# Patient Record
Sex: FEMALE | Race: WHITE | NOT HISPANIC OR LATINO | Employment: FULL TIME | URBAN - METROPOLITAN AREA
[De-identification: names, ages, dates, MRNs, and addresses within clinical notes are randomized per-mention and may not be internally consistent; named-entity substitution may affect disease eponyms.]

---

## 2017-06-08 ENCOUNTER — ALLSCRIPTS OFFICE VISIT (OUTPATIENT)
Dept: OTHER | Facility: OTHER | Age: 47
End: 2017-06-08

## 2017-06-08 ENCOUNTER — LAB REQUISITION (OUTPATIENT)
Dept: LAB | Facility: HOSPITAL | Age: 47
End: 2017-06-08
Payer: COMMERCIAL

## 2017-06-08 DIAGNOSIS — Z12.31 ENCOUNTER FOR SCREENING MAMMOGRAM FOR MALIGNANT NEOPLASM OF BREAST: ICD-10-CM

## 2017-06-08 DIAGNOSIS — Z01.419 ENCOUNTER FOR GYNECOLOGICAL EXAMINATION WITHOUT ABNORMAL FINDING: ICD-10-CM

## 2017-06-08 PROCEDURE — 87624 HPV HI-RISK TYP POOLED RSLT: CPT | Performed by: OBSTETRICS & GYNECOLOGY

## 2017-06-08 PROCEDURE — G0145 SCR C/V CYTO,THINLAYER,RESCR: HCPCS | Performed by: OBSTETRICS & GYNECOLOGY

## 2017-06-14 LAB — HPV RRNA GENITAL QL NAA+PROBE: NORMAL

## 2017-06-15 LAB
LAB AP GYN PRIMARY INTERPRETATION: NORMAL
Lab: NORMAL

## 2018-01-14 VITALS
HEIGHT: 67 IN | WEIGHT: 152.13 LBS | SYSTOLIC BLOOD PRESSURE: 100 MMHG | OXYGEN SATURATION: 98 % | BODY MASS INDEX: 23.88 KG/M2 | HEART RATE: 70 BPM | DIASTOLIC BLOOD PRESSURE: 58 MMHG

## 2018-02-19 ENCOUNTER — HOSPITAL ENCOUNTER (OUTPATIENT)
Dept: MAMMOGRAPHY | Facility: HOSPITAL | Age: 48
Discharge: HOME | End: 2018-02-19
Payer: COMMERCIAL

## 2018-02-19 DIAGNOSIS — Z12.31 ENCOUNTER FOR SCREENING MAMMOGRAM FOR MALIGNANT NEOPLASM OF BREAST: ICD-10-CM

## 2018-02-19 PROCEDURE — 77067 SCR MAMMO BI INCL CAD: CPT

## 2018-02-19 PROCEDURE — 77063 BREAST TOMOSYNTHESIS BI: CPT

## 2018-06-11 PROBLEM — Z12.31 ENCOUNTER FOR SCREENING MAMMOGRAM FOR BREAST CANCER: Status: ACTIVE | Noted: 2018-06-11

## 2018-06-11 PROBLEM — Z01.419 ENCOUNTER FOR ANNUAL ROUTINE GYNECOLOGICAL EXAMINATION: Status: ACTIVE | Noted: 2018-06-11

## 2018-06-11 NOTE — PROGRESS NOTES
Assessment/Plan:  Normal gynecologic exam  CoTest '20  Chest pain-to see PCP  Return to the office in 1 year  Self breast exams monthly  Annual 3-D mammography  Colonoscopy 55  Calcium 1,000 mg/d- does  Exercise 3 times a week- she does 2 since returning the work force      Diagnoses and all orders for this visit:    Encounter for annual routine gynecological examination    Encounter for screening mammogram for breast cancer  -     Mammo screening bilateral w 3d & cad; Future              Subjective:        Patient ID: John Paul Sol is a 50 y o  female  René Carcamo is here for her annual visit  Her last period was in January  She has experienced mild hot flashes over the past year which are fleeting and described as a nuisance  Occasionally she has experienced chest pain which may be related to stress  The following portions of the patient's history were reviewed and updated as appropriate: She  has no past medical history on file  PMH:   2 para 2; OFD, then SAVD - 41 wks '99 and induced at 39 wks- 5 cm  '01  DDD- Back  Menorrhagia, PC Bleeding Cervical Polyp - HSC, D&C, Polypectomy, Cauterization of Cx   Basal Cell Ca - Face and Shoulders ', 2nd episode MOH's nose   B12 Deficiency   BELEM-      Patient Active Problem List    Diagnosis Date Noted    Encounter for annual routine gynecological examination 2018    Encounter for screening mammogram for breast cancer 2018     She  has a past surgical history that includes Nose surgery and Dilation and curettage of uterus  Her family history includes Aortic aneurysm in her father; Colon cancer in her maternal grandmother; Heart disease in her paternal grandfather; Lung cancer in her maternal grandfather; No Known Problems in her brother, brother, brother, daughter, and son; Pancreatic cancer in her mother     FH:   Maternal grandmother- colon cancer  MGF- Lung Ca  PGF- MI 39  Mother- colon polyps, Pancreatitis, d POD 1 after surgery for Pancreatic Cancer 7/17, DVT 4/17  Father- AAA, 2 repaired 68, Squamous Cell Ca -  face, d 11/16 ischemic bowel, acute obstruction  She  reports that she has never smoked  She has never used smokeless tobacco  She reports that she drinks alcohol  She reports that she does not use drugs  SH:  She just returned to work in November '15 her former place of employment prior to children -   The office just moved to [place] which is a longer commute   '98  [employer] 6/99 - [employer] 4/01- playing football - both me  Magda Medrano works there too  Vasectomy  No current outpatient prescriptions on file  No current facility-administered medications for this visit  No current outpatient prescriptions on file prior to visit  No current facility-administered medications on file prior to visit  She has No Known Allergies       Review of Systems   Constitutional: Negative for activity change, appetite change, fatigue and unexpected weight change  Eyes: Negative for visual disturbance  Respiratory: Negative for cough, chest tightness, shortness of breath and wheezing  Cardiovascular: Positive for chest pain  Negative for palpitations and leg swelling  Breast: Patient denies tenderness, nipple discharge, masses, or erythema  Gastrointestinal: Negative for abdominal distention, abdominal pain, blood in stool, constipation, diarrhea, nausea and vomiting  Endocrine: Negative for cold intolerance and heat intolerance  Genitourinary: Negative for decreased urine volume, difficulty urinating, dyspareunia, dysuria, frequency, hematuria, menstrual problem, pelvic pain, urgency, vaginal bleeding, vaginal discharge and vaginal pain  Occasional BELEM  Musculoskeletal: Negative for arthralgias  Skin: Negative for rash  Neurological: Negative for weakness, light-headedness, numbness and headaches  Hematological: Does not bruise/bleed easily  Psychiatric/Behavioral: Negative for agitation, behavioral problems and sleep disturbance  The patient is not nervous/anxious  Objective:    Vitals:    06/12/18 0824   BP: 90/60   BP Location: Right arm   Patient Position: Sitting   Cuff Size: Standard   Pulse: 64   Weight: 70 8 kg (156 lb)   Height: 5' 7" (1 702 m)            Physical Exam   Constitutional: She is oriented to person, place, and time  She appears well-developed and well-nourished  HENT:   Head: Normocephalic and atraumatic  Eyes: Conjunctivae and EOM are normal  Pupils are equal, round, and reactive to light  Neck: Normal range of motion  Neck supple  No tracheal deviation present  No thyromegaly present  Cardiovascular: Normal rate, regular rhythm and normal heart sounds  No murmur heard  Pulmonary/Chest: Effort normal and breath sounds normal  No respiratory distress  She has no wheezes  Right breast exhibits no inverted nipple, no mass, no nipple discharge, no skin change and no tenderness  Left breast exhibits no inverted nipple, no mass, no nipple discharge, no skin change and no tenderness  Breasts are symmetrical    Abdominal: Soft  Bowel sounds are normal  She exhibits no distension and no mass  There is no tenderness  Genitourinary: Vagina normal and uterus normal  Rectal exam shows no external hemorrhoid  No breast swelling, tenderness, discharge or bleeding  There is no rash, tenderness or lesion on the right labia  There is no rash, tenderness or lesion on the left labia  Uterus is not deviated, not enlarged and not tender  Cervix exhibits no motion tenderness and no discharge  Right adnexum displays no mass, no tenderness and no fullness  Left adnexum displays no mass, no tenderness and no fullness  Musculoskeletal: Normal range of motion  Neurological: She is alert and oriented to person, place, and time  Skin: Skin is warm and dry  Psychiatric: She has a normal mood and affect   Her behavior is normal  Judgment and thought content normal    Nursing note and vitals reviewed

## 2018-06-12 ENCOUNTER — ANNUAL EXAM (OUTPATIENT)
Dept: GYNECOLOGY | Facility: CLINIC | Age: 48
End: 2018-06-12
Payer: COMMERCIAL

## 2018-06-12 VITALS
WEIGHT: 156 LBS | HEIGHT: 67 IN | HEART RATE: 64 BPM | BODY MASS INDEX: 24.48 KG/M2 | DIASTOLIC BLOOD PRESSURE: 60 MMHG | SYSTOLIC BLOOD PRESSURE: 90 MMHG

## 2018-06-12 DIAGNOSIS — Z12.31 ENCOUNTER FOR SCREENING MAMMOGRAM FOR BREAST CANCER: ICD-10-CM

## 2018-06-12 DIAGNOSIS — Z01.419 ENCOUNTER FOR ANNUAL ROUTINE GYNECOLOGICAL EXAMINATION: Primary | ICD-10-CM

## 2018-06-12 PROCEDURE — S0612 ANNUAL GYNECOLOGICAL EXAMINA: HCPCS | Performed by: OBSTETRICS & GYNECOLOGY

## 2019-03-20 ENCOUNTER — HOSPITAL ENCOUNTER (OUTPATIENT)
Dept: RADIOLOGY | Age: 49
Discharge: HOME | End: 2019-03-20
Payer: COMMERCIAL

## 2019-03-20 VITALS — BODY MASS INDEX: 24.8 KG/M2 | HEIGHT: 67 IN | WEIGHT: 158 LBS

## 2019-03-20 DIAGNOSIS — Z12.31 ENCOUNTER FOR SCREENING MAMMOGRAM FOR BREAST CANCER: ICD-10-CM

## 2019-03-20 PROCEDURE — 77063 BREAST TOMOSYNTHESIS BI: CPT

## 2019-03-20 PROCEDURE — 77067 SCR MAMMO BI INCL CAD: CPT

## 2019-06-18 ENCOUNTER — ANNUAL EXAM (OUTPATIENT)
Dept: GYNECOLOGY | Facility: CLINIC | Age: 49
End: 2019-06-18
Payer: COMMERCIAL

## 2019-06-18 VITALS
HEART RATE: 77 BPM | DIASTOLIC BLOOD PRESSURE: 70 MMHG | BODY MASS INDEX: 25.39 KG/M2 | SYSTOLIC BLOOD PRESSURE: 122 MMHG | WEIGHT: 161.8 LBS | HEIGHT: 67 IN

## 2019-06-18 DIAGNOSIS — Z01.419 ENCOUNTER FOR ANNUAL ROUTINE GYNECOLOGICAL EXAMINATION: Primary | ICD-10-CM

## 2019-06-18 DIAGNOSIS — Z12.31 ENCOUNTER FOR SCREENING MAMMOGRAM FOR BREAST CANCER: ICD-10-CM

## 2019-06-18 PROCEDURE — 99396 PREV VISIT EST AGE 40-64: CPT | Performed by: OBSTETRICS & GYNECOLOGY

## 2020-06-02 ENCOUNTER — HOSPITAL ENCOUNTER (OUTPATIENT)
Dept: RADIOLOGY | Age: 50
Discharge: HOME | End: 2020-06-02
Payer: COMMERCIAL

## 2020-06-02 VITALS — BODY MASS INDEX: 24.8 KG/M2 | HEIGHT: 67 IN | WEIGHT: 158 LBS

## 2020-06-02 DIAGNOSIS — Z12.31 ENCOUNTER FOR SCREENING MAMMOGRAM FOR BREAST CANCER: ICD-10-CM

## 2020-06-02 PROCEDURE — 77067 SCR MAMMO BI INCL CAD: CPT

## 2020-06-02 PROCEDURE — 77063 BREAST TOMOSYNTHESIS BI: CPT

## 2020-06-30 ENCOUNTER — HOSPITAL ENCOUNTER (OUTPATIENT)
Dept: ULTRASOUND IMAGING | Facility: CLINIC | Age: 50
Discharge: HOME | End: 2020-06-30
Payer: COMMERCIAL

## 2020-06-30 ENCOUNTER — HOSPITAL ENCOUNTER (OUTPATIENT)
Dept: MAMMOGRAPHY | Facility: CLINIC | Age: 50
Discharge: HOME | End: 2020-06-30
Payer: COMMERCIAL

## 2020-06-30 VITALS — WEIGHT: 158 LBS | BODY MASS INDEX: 24.8 KG/M2 | HEIGHT: 67 IN

## 2020-06-30 DIAGNOSIS — R92.8 ABNORMAL MAMMOGRAM: ICD-10-CM

## 2020-06-30 PROCEDURE — 77065 DX MAMMO INCL CAD UNI: CPT

## 2020-06-30 PROCEDURE — G0279 TOMOSYNTHESIS, MAMMO: HCPCS

## 2020-06-30 PROCEDURE — 76642 ULTRASOUND BREAST LIMITED: CPT

## 2020-07-04 NOTE — PROGRESS NOTES
Assessment/Plan:  Normal gynecologic exam  PMB, Cervical Polyp- Polypectomy, HSC, EMB  US as an alternative also discussed  Two Advil 1 hour pre  CoTest   Return to the office in 1 year  Self breast exams monthly  Annual 3-D mammography  Colonoscopy 39 - FH Colon Ca  Calcium 1,000 mg/d- does  Exercise 3 times a week- very infrequent due to work        Diagnoses and all orders for this visit:    Encounter for annual routine gynecological examination  -     Liquid-based pap, screening    Screening for cervical cancer  -     Liquid-based pap, screening    Encounter for screening mammogram for breast cancer  -     Mammo screening bilateral w 3d & cad; Future              Subjective:        Patient ID: Yolande Sandoval is a 48 y o  female  Felisa Hartmann is here for her yearly visit  She is without any gynecologic complaints  She did have an episode of bleeding that lasted 7 days in November  The bleeding was painless  Her menopause was 2018  Her paternal aunt  in December from metastatic uterine cancer  Uterine cancer is a possible etiology of postmenopausal bleeding  The following portions of the patient's history were reviewed and updated as appropriate: She  has no past medical history on file  Patient Active Problem List    Diagnosis Date Noted    Encounter for annual routine gynecological examination 2018    Encounter for screening mammogram for breast cancer 2018   PMH:   2 para 2; OFD, then SAVD - 41 wks '99 and induced at 39 wks- 5 cm  '01  DDD- Back  Menorrhagia, PC Bleeding Cervical Polyp - HSC, D&C, Polypectomy, Cauterization of Cx   Basal Cell Ca - Face and Shoulders ', 2nd episode MOH's nose   B12 Deficiency       BELEM-        Menopause       Abnormal mammogram of the right breast - normal diagnostic mammogram, return to routine screening    She  has a past surgical history that includes Nose surgery and Dilation and curettage of uterus  Her family history includes Aortic aneurysm in her father; Colon cancer in her maternal grandmother; Endometrial cancer (age of onset: 71) in her paternal aunt; Heart disease in her paternal grandfather; Lung cancer in her maternal grandfather; No Known Problems in her brother, brother, brother, daughter, paternal aunt, paternal grandmother, and son; Pancreatic cancer (age of onset: 68) in her mother  FH:   Maternal grandmother- colon cancer  MGF- Lung Ca  PGF- MI 39  Mother- colon polyps, Pancreatitis, d POD 1 after surgery for Pancreatic Cancer 7/17, DVT 4/17  Father- AAA, 2 repaired 68, Squamous Cell Ca -  face, d 11/16 ischemic bowel, acute obstruction  MGM- Colon Ca  PA- Uterine Ca '14, d metastatic Ca  Early 70's 12/19, had Chemo  She  reports that she has never smoked  She has never used smokeless tobacco  She reports that she drinks alcohol  She reports that she does not use drugs  SH:  Returned to work in November '15 her former place of employment prior to children -   The office just moved to [facility] which is a longer commute   [facility] '98- Hernán Ayala works there too  Vasectomy  Lisa Becerril 6/99 - [employer] 4/01- playing soccer and will be going to [employer] [facility]/[employer] [address] at [address] [facility]  No current outpatient medications on file  No current facility-administered medications for this visit  No current outpatient medications on file prior to visit  No current facility-administered medications on file prior to visit  She has No Known Allergies       Review of Systems   Constitutional: Negative for activity change, appetite change, chills, fatigue, fever and unexpected weight change  HENT: Negative for congestion, rhinorrhea, sinus pressure, sore throat and trouble swallowing  Eyes: Negative for discharge, redness, itching and visual disturbance     Respiratory: Negative for cough, chest tightness, shortness of breath and wheezing  Cardiovascular: Negative for chest pain, palpitations and leg swelling  Gastrointestinal: Negative for abdominal distention, abdominal pain, blood in stool, constipation, diarrhea, nausea and vomiting  Genitourinary: Negative for decreased urine volume, difficulty urinating, dyspareunia, dysuria, frequency, hematuria, menstrual problem, pelvic pain, urgency, vaginal bleeding, vaginal discharge and vaginal pain  Sexually active  Musculoskeletal: Negative for arthralgias  Skin: Negative for rash  Neurological: Negative for weakness, light-headedness, numbness and headaches  Hematological: Does not bruise/bleed easily  Psychiatric/Behavioral: Negative for agitation, behavioral problems and sleep disturbance  The patient is not nervous/anxious  Objective: There were no vitals filed for this visit  Physical Exam   Constitutional: She is oriented to person, place, and time  She appears well-developed and well-nourished  HENT:   Head: Normocephalic and atraumatic  Eyes: Pupils are equal, round, and reactive to light  Conjunctivae and EOM are normal    Neck: Normal range of motion  Neck supple  No tracheal deviation present  No thyromegaly present  Cardiovascular: Normal rate, regular rhythm and normal heart sounds  No murmur heard  Pulmonary/Chest: Effort normal and breath sounds normal  No respiratory distress  She has no wheezes  Right breast exhibits no inverted nipple, no mass, no nipple discharge, no skin change and no tenderness  Left breast exhibits no inverted nipple, no mass, no nipple discharge, no skin change and no tenderness  No breast tenderness, discharge or bleeding  Breasts are symmetrical    Abdominal: Soft  Bowel sounds are normal  She exhibits no distension and no mass  There is no tenderness  Genitourinary: Vagina normal and uterus normal  Rectal exam shows no external hemorrhoid  No breast tenderness, discharge or bleeding   There is no rash, tenderness or lesion on the right labia  There is no rash, tenderness or lesion on the left labia  Uterus is not deviated, not enlarged and not tender  Cervix exhibits no motion tenderness and no discharge  Right adnexum displays no mass, no tenderness and no fullness  Left adnexum displays no mass, no tenderness and no fullness  Genitourinary Comments: Urethral meatus within normal limits  Perineum within normal limits  Bladder well supported  Small cervical polyp  The uterus is retroverted and normal size  Musculoskeletal: Normal range of motion  Neurological: She is alert and oriented to person, place, and time  Skin: Skin is warm and dry  Psychiatric: She has a normal mood and affect  Her behavior is normal  Judgment and thought content normal    Nursing note and vitals reviewed

## 2020-07-06 ENCOUNTER — ANNUAL EXAM (OUTPATIENT)
Dept: GYNECOLOGY | Facility: CLINIC | Age: 50
End: 2020-07-06
Payer: COMMERCIAL

## 2020-07-06 VITALS — BODY MASS INDEX: 24.75 KG/M2 | HEIGHT: 67 IN

## 2020-07-06 DIAGNOSIS — Z12.31 ENCOUNTER FOR SCREENING MAMMOGRAM FOR BREAST CANCER: ICD-10-CM

## 2020-07-06 DIAGNOSIS — Z12.4 SCREENING FOR CERVICAL CANCER: ICD-10-CM

## 2020-07-06 DIAGNOSIS — Z01.419 ENCOUNTER FOR ANNUAL ROUTINE GYNECOLOGICAL EXAMINATION: Primary | ICD-10-CM

## 2020-07-06 PROCEDURE — 99396 PREV VISIT EST AGE 40-64: CPT | Performed by: OBSTETRICS & GYNECOLOGY

## 2020-07-06 PROCEDURE — G0145 SCR C/V CYTO,THINLAYER,RESCR: HCPCS | Performed by: OBSTETRICS & GYNECOLOGY

## 2020-07-06 PROCEDURE — 87624 HPV HI-RISK TYP POOLED RSLT: CPT | Performed by: OBSTETRICS & GYNECOLOGY

## 2020-07-10 LAB
HPV HR 12 DNA CVX QL NAA+PROBE: NEGATIVE
HPV16 DNA CVX QL NAA+PROBE: NEGATIVE
HPV18 DNA CVX QL NAA+PROBE: NEGATIVE
LAB AP GYN PRIMARY INTERPRETATION: NORMAL
Lab: NORMAL

## 2020-07-31 NOTE — PROGRESS NOTES
Assessment/Plan:  Endometrial atrophy most likely the reason for the bleeding  Endosee with endometrial biopsy without problems       Diagnoses and all orders for this visit:    PMB (postmenopausal bleeding)    Cervical polyp              Subjective:        Patient ID: Ana M Austin is a 48 y o  female  Claire Fajardo returns for the endometrial biopsy and polypectomy  Her history is not changed  The following portions of the patient's history were reviewed and updated as appropriate: She  has no past medical history on file  Patient Active Problem List    Diagnosis Date Noted    Encounter for annual routine gynecological examination 06/11/2018    Encounter for screening mammogram for breast cancer 06/11/2018     She  has a past surgical history that includes Nose surgery and Dilation and curettage of uterus  Her family history includes Aortic aneurysm in her father; Colon cancer in her maternal grandmother; Endometrial cancer (age of onset: 71) in her paternal aunt; Heart disease in her paternal grandfather; Lung cancer in her maternal grandfather; No Known Problems in her brother, brother, brother, daughter, paternal aunt, paternal grandmother, and son; Pancreatic cancer (age of onset: 68) in her mother  She  reports that she has never smoked  She has never used smokeless tobacco  She reports that she drinks alcohol  She reports that she does not use drugs  No current outpatient medications on file  No current facility-administered medications for this visit  No current outpatient medications on file prior to visit  No current facility-administered medications on file prior to visit  She has No Known Allergies       Review of Systems   Constitutional: Negative  Objective: There were no vitals filed for this visit  Physical Exam   Constitutional: No distress  Abdominal: Normal appearance  Neurological: She is alert           Endosee Office Hysteroscopy with Endometrial Biopsy    After informed consent a speculum was placed in the vagina  A pelvic exam was recently performed or repeated  Pregnancy was ruled out  The cervix was prepped using Betadine solution  The anterior lip was grasped with a tenaculum  The cervix was dilated to 3 mm  The Endosee hysteroscope was advanced and sterile water was infused to distend the cavity  Hysteroscopy was carried out with intermittent additional sterile water infusion  A total of 20 mL was used  The entire cavity was viewed 3 times, pictures and video were taken  The Endosee disposable canula was used to aspirate the endometrial distended fluid  The device was removed  The patient tolerated the hysteroscopy well and the images were reviewed after the EMB  An endometrial biopsy was then performed  The cavity sounded to 7 cm   2  passes were performed with notable suction present  A scant amount of tissue was retrieved  The endometrial biopsy was tolerated           She had taken Advil  Instructions were given  Pathology pending  Hysteroscopy revealed a normal cavity, basal endometrium, and no lesions  The endocervical canal was normal and no additional polyps were seen  The endometrium had the appearance of atrophy  Imp:  Atrophic endometrium      Cervical Polypectomy  After informed consent the cervix was prepped with Betadine solution  The polyp was grasped with an Allis/Curved [place], spun, and avulsed  Hemostasis was present  Instructions were given  Pelvic rest for 2 days  Please call the office if you not heard from us within 2 weeks with the pathology report

## 2020-08-03 ENCOUNTER — PROCEDURE VISIT (OUTPATIENT)
Dept: GYNECOLOGY | Facility: CLINIC | Age: 50
End: 2020-08-03
Payer: COMMERCIAL

## 2020-08-03 VITALS
DIASTOLIC BLOOD PRESSURE: 72 MMHG | BODY MASS INDEX: 25.71 KG/M2 | WEIGHT: 163.8 LBS | HEIGHT: 67 IN | TEMPERATURE: 97.7 F | SYSTOLIC BLOOD PRESSURE: 112 MMHG

## 2020-08-03 DIAGNOSIS — N95.0 PMB (POSTMENOPAUSAL BLEEDING): Primary | ICD-10-CM

## 2020-08-03 DIAGNOSIS — N84.1 CERVICAL POLYP: ICD-10-CM

## 2020-08-03 PROCEDURE — 57500 BIOPSY OF CERVIX: CPT | Performed by: OBSTETRICS & GYNECOLOGY

## 2020-08-03 PROCEDURE — 88305 TISSUE EXAM BY PATHOLOGIST: CPT | Performed by: PATHOLOGY

## 2020-08-03 PROCEDURE — 58558 HYSTEROSCOPY BIOPSY: CPT | Performed by: OBSTETRICS & GYNECOLOGY

## 2021-06-07 ENCOUNTER — HOSPITAL ENCOUNTER (OUTPATIENT)
Dept: RADIOLOGY | Age: 51
Discharge: HOME | End: 2021-06-07
Payer: COMMERCIAL

## 2021-06-07 VITALS — WEIGHT: 160 LBS | BODY MASS INDEX: 25.11 KG/M2 | HEIGHT: 67 IN

## 2021-06-07 DIAGNOSIS — Z12.31 ENCOUNTER FOR SCREENING MAMMOGRAM FOR BREAST CANCER: ICD-10-CM

## 2021-06-07 PROCEDURE — 77063 BREAST TOMOSYNTHESIS BI: CPT

## 2021-06-07 PROCEDURE — 77067 SCR MAMMO BI INCL CAD: CPT

## 2021-06-09 ENCOUNTER — TELEPHONE (OUTPATIENT)
Dept: OBGYN CLINIC | Facility: CLINIC | Age: 51
End: 2021-06-09

## 2021-06-09 NOTE — TELEPHONE ENCOUNTER
Per comm consent lm to pt results WNL of mammo    ----- Message from Coral Khoury MD sent at 6/9/2021  2:52 PM EDT -----  Results are normal  Please notify patient

## 2021-07-27 NOTE — PROGRESS NOTES
Assessment/Plan:  Normal gynecologic exam  HF's- [place] 20 mr bid  PMB , Cervical Polyp- Polypectomy, HSC, EMB   CoTest   Return to the office in 1 year  Self breast exams monthly  Annual 3-D mammography  Colonoscopy 45 - FH Colon Ca  Calcium 1,000 mg/d- does  Exercise 3 times a week- very infrequent due to work        Diagnoses and all orders for this visit:    Encounter for annual routine gynecological examination    Encounter for screening mammogram for breast cancer  -     Mammo screening bilateral w 3d & cad; Future              Subjective:        Patient ID: Daren Fernandez is a 46 y o  female  Dale Sons is here for her yearly gyn exam   She has been having hot flashes daily for the past 3 months  She does not experience any night sweats  She has not had a recurrence of postmenopausal bleeding  The following portions of the patient's history were reviewed and updated as appropriate: She  has no past medical history on file  Patient Active Problem List    Diagnosis Date Noted    Encounter for annual routine gynecological examination 2018    Encounter for screening mammogram for breast cancer 2018   PMH:   2 para 2; OFD, then SAVD - 41 wks '99 and induced at 39 wks- 5 cm  '01  DDD- Back  Menorrhagia, PC Bleeding Cervical Polyp - HSC, D&C, Polypectomy, Cauterization of Cx   Basal Cell Ca - Face and Shoulders , 2nd episode MOH's nose   B12 Deficiency       BELEM-        Menopause       Abnormal mammogram of the right breast - normal diagnostic mammogram, return to routine screening  PMB - Endosee with nl EMB [atrophy] Cervical Polypectomy       Chest Pain Fall - nl Stress Test, had swelling- NEG Factor V Leiden      Forehead Basal Cell Ca- Mohs 3/21  She  has a past surgical history that includes Nose surgery and Dilation and curettage of uterus    Her family history includes Aortic aneurysm in her father; Colon cancer in her maternal grandmother; Endometrial cancer (age of onset: 71) in her paternal aunt; Heart disease in her paternal grandfather; Lung cancer in her maternal grandfather; No Known Problems in her brother, brother, brother, daughter, paternal aunt, paternal grandmother, and son; Pancreatic cancer (age of onset: 68) in her mother  FH:   Maternal grandmother- colon cancer  MGF- Lung Ca  PGF- MI 39  Mother- colon polyps, Pancreatitis, d POD 1 after surgery for Pancreatic Cancer 7/17, DVT 4/17  Father- AAA, 2 repaired 68, Squamous Cell Ca -  face, d 11/16 ischemic bowel, acute obstruction  MGM- Colon Ca  PA- Uterine Ca '14, d metastatic Ca  Early 70's 12/19, had Chemo  MU- PE, + Factor V  She  reports that she has never smoked  She has never used smokeless tobacco  She reports current alcohol use  She reports that she does not use drugs  SH:  Returned to work in November '15 her former place of employment prior to children -   The office just moved to [place] which is a longer commute    '98- [employer] works there too  Vasectomy  Yamileth Han 6/99 - just graduated [employer] 4/01- at [employer] at [address] [facility]- both me  No current outpatient medications on file  No current facility-administered medications for this visit  No current outpatient medications on file prior to visit  No current facility-administered medications on file prior to visit  She has No Known Allergies       Review of Systems   Constitutional: Negative for activity change, appetite change, fatigue and unexpected weight change  Eyes: Negative for visual disturbance  Respiratory: Negative for cough, chest tightness, shortness of breath and wheezing  Cardiovascular: Negative for chest pain, palpitations and leg swelling  Breast: Patient denies tenderness, nipple discharge, masses, or erythema     Gastrointestinal: Negative for abdominal distention, abdominal pain, blood in stool, constipation, diarrhea, nausea and vomiting  Endocrine: Negative for cold intolerance and heat intolerance  Genitourinary: Negative for decreased urine volume, difficulty urinating, dyspareunia, dysuria, frequency, hematuria, menstrual problem, pelvic pain, urgency, vaginal bleeding, vaginal discharge and vaginal pain  [place] once a week using a lubricant  Her stress incontinence resolved when she stopped playing volleyball   Musculoskeletal: Negative for arthralgias  Skin: Negative for rash  Neurological: Negative for weakness, light-headedness, numbness and headaches  Hematological: Does not bruise/bleed easily  Psychiatric/Behavioral: Negative for agitation, behavioral problems and sleep disturbance  The patient is nervous/anxious  Objective:    Vitals:    07/28/21 1142   BP: 120/70   Weight: 75 1 kg (165 lb 9 6 oz)            Physical Exam  Vitals and nursing note reviewed  Constitutional:       Appearance: She is well-developed  HENT:      Head: Normocephalic and atraumatic  Eyes:      General: No scleral icterus  Right eye: No discharge  Left eye: No discharge  Extraocular Movements: Extraocular movements intact  Conjunctiva/sclera: Conjunctivae normal    Neck:      Thyroid: No thyromegaly  Trachea: No tracheal deviation  Cardiovascular:      Rate and Rhythm: Normal rate and regular rhythm  Heart sounds: Normal heart sounds  No murmur heard  Pulmonary:      Effort: Pulmonary effort is normal  No respiratory distress  Breath sounds: Normal breath sounds  No wheezing  Chest:      Breasts: Breasts are symmetrical          Right: No inverted nipple, mass, nipple discharge, skin change or tenderness  Left: No inverted nipple, mass, nipple discharge, skin change or tenderness  Abdominal:      General: Bowel sounds are normal  There is no distension  Palpations: Abdomen is soft  There is no mass  Tenderness: There is no abdominal tenderness  There is no right CVA tenderness, left CVA tenderness or guarding  Genitourinary:     General: Normal vulva  Labia:         Right: No rash, tenderness or lesion  Left: No rash, tenderness or lesion  Vagina: Normal       Cervix: No cervical motion tenderness or discharge  Uterus: Not deviated, not enlarged and not tender  Adnexa:         Right: No mass, tenderness or fullness  Left: No mass, tenderness or fullness  Rectum: No external hemorrhoid  Comments: Urethral meatus within normal limits  Perineum within normal limits  Bladder well supported  The uterus is retroverted and normal size  Musculoskeletal:         General: Normal range of motion  Cervical back: Normal range of motion and neck supple  Skin:     General: Skin is warm and dry  Neurological:      Mental Status: She is alert and oriented to person, place, and time  Psychiatric:         Mood and Affect: Mood normal          Behavior: Behavior normal          Thought Content:  Thought content normal          Judgment: Judgment normal

## 2021-07-28 ENCOUNTER — ANNUAL EXAM (OUTPATIENT)
Dept: OBGYN CLINIC | Facility: CLINIC | Age: 51
End: 2021-07-28
Payer: COMMERCIAL

## 2021-07-28 VITALS — WEIGHT: 165.6 LBS | DIASTOLIC BLOOD PRESSURE: 70 MMHG | SYSTOLIC BLOOD PRESSURE: 120 MMHG | BODY MASS INDEX: 25.94 KG/M2

## 2021-07-28 DIAGNOSIS — Z12.31 ENCOUNTER FOR SCREENING MAMMOGRAM FOR BREAST CANCER: ICD-10-CM

## 2021-07-28 DIAGNOSIS — Z01.419 ENCOUNTER FOR ANNUAL ROUTINE GYNECOLOGICAL EXAMINATION: Primary | ICD-10-CM

## 2021-07-28 PROCEDURE — 99396 PREV VISIT EST AGE 40-64: CPT | Performed by: OBSTETRICS & GYNECOLOGY

## 2022-08-14 NOTE — PROGRESS NOTES
Assessment/Plan:  Normal gynecologic exam  HF's- Black Cohosh 20 mg bid- never used  PMB , Cervical Polyp- Polypectomy, HSC, EMB   CoTest   Return to the office in 1 year  Self breast exams monthly  Annual 3-D mammography - active order  Colonoscopy  -  Colon Ca - nl , due   Calcium 1,000 mg/d- does  Exercise 3 times a week- very infrequent due to work        Diagnoses and all orders for this visit:    Encounter for annual routine gynecological examination    Encounter for screening mammogram for breast cancer              Subjective:        Patient ID: Shant Salvador is a 46 y o  female  [facility] returns for a yearly evaluation  She has no complaints  She remains sexually active  She denies any bleeding  She notes mild hot flashes night sweats which never required her to try black cohosh  She has not been exercising as much as usual and has gained weight  She does take calcium supplements  She is in a transition period between insurance companies  She will be scheduling a mammogram in the near future  The following portions of the patient's history were reviewed and updated as appropriate: She  has no past medical history on file  Patient Active Problem List    Diagnosis Date Noted    Encounter for annual routine gynecological examination 2018    Encounter for screening mammogram for breast cancer 2018   PMH:   2 para 2; OFD, then SAVD - 41 wks '99 and induced at 39 wks- 5 cm  '01  DDD- Back  Menorrhagia, PC Bleeding Cervical Polyp - HSC, D&C, Polypectomy, Cauterization of Cx   Basal Cell Ca - Face and Shoulders ', 2nd episode MOH's nose   B12 Deficiency       BELEM-        Menopause       Abnormal mammogram of the right breast - normal diagnostic mammogram, return to routine screening        PMB - Endosee with nl EMB [atrophy] Cervical Polypectomy       Chest Pain - nl Stress Test, had swelling- NEG Factor V Leiden Forehead Basal Cell Ca- Mohs 3/21  She  has a past surgical history that includes Nose surgery and Dilation and curettage of uterus  Her family history includes Aortic aneurysm in her father; Colon cancer in her maternal grandmother; Endometrial cancer (age of onset: 71) in her paternal aunt; Heart disease in her paternal grandfather; Lung cancer in her maternal grandfather; No Known Problems in her brother, brother, brother, daughter, paternal aunt, paternal grandmother, and son; Pancreatic cancer (age of onset: 68) in her mother  FH:   Maternal grandmother- colon cancer  MGF- Lung Ca  PGF- MI 39  Mother- colon polyps, Pancreatitis, d POD 1 after surgery for Pancreatic Cancer 7/17, DVT 4/17  Father- AAA, 2 repaired 68, Squamous Cell Ca -  face, d 11/16 ischemic bowel, acute obstruction  MGM- Colon Ca  PA- Uterine Ca '14, d metastatic Ca  Early 70's 12/19, had Chemo  MU- PE, + Factor V  She  reports that she has never smoked  She has never used smokeless tobacco  She reports current alcohol use  She reports that she does not use drugs  SH:  Returned to work in November '15 her former place of employment prior to children -   The office just moved to [place] which is a longer commute   Married '98- Jony works there too  Vasectomy  Taisha Henry 6/99 - graduated [employer] '21 and is moving to [place] to live with her boyfriend, Candice Correa 4/01- at [employer] [facility] [facility] [employer] [facility] at [address]- both me   He is able to play soccer while doing his master's  No current outpatient medications on file  No current facility-administered medications for this visit  No current outpatient medications on file prior to visit  No current facility-administered medications on file prior to visit  She has No Known Allergies       Review of Systems   Constitutional: Negative for activity change, appetite change, fatigue and unexpected weight change  Eyes: Negative for visual disturbance  Respiratory: Negative for cough, chest tightness, shortness of breath and wheezing  Cardiovascular: Negative for chest pain, palpitations and leg swelling  Breast: Patient denies tenderness, nipple discharge, masses, or erythema  Gastrointestinal: Negative for abdominal distention, abdominal pain, blood in stool, constipation, diarrhea, nausea and vomiting  Endocrine: Negative for cold intolerance and heat intolerance  Genitourinary: Negative for decreased urine volume, difficulty urinating, dyspareunia, dysuria, frequency, hematuria, menstrual problem, pelvic pain, urgency, vaginal bleeding, vaginal discharge and vaginal pain  [place] once to twice a week  Sometimes uses a lubricant  Musculoskeletal: Negative for arthralgias  Skin: Negative for rash  Neurological: Negative for weakness, light-headedness, numbness and headaches  Hematological: Does not bruise/bleed easily  Psychiatric/Behavioral: Negative for agitation, behavioral problems and sleep disturbance  The patient is not nervous/anxious  Objective:    /80 (BP Location: Left arm, Patient Position: Sitting, Cuff Size: Standard)   Ht 5' 7" (1 702 m)   Wt 75 8 kg (167 lb)   LMP 11/11/2019   BMI 26 16 kg/m²       Physical Exam  Constitutional:       Appearance: She is well-developed  Genitourinary:      Vulva and urethral meatus normal       Genitourinary Comments: The urethral meatus is normal   The perineum is within normal limits  The bladder is well supported  Right Adnexa: not tender, not full, no mass present and not absent  Left Adnexa: not tender, not full, no mass present and not absent  No cervical motion tenderness, discharge or friability  Uterus is not enlarged, fixed or prolapsed  Uterus is midaxial    Breasts: Breasts are symmetrical       Right: No inverted nipple, mass, nipple discharge, skin change or tenderness        Left: No inverted nipple, mass, nipple discharge, skin change or tenderness  HENT:      Head: Normocephalic and atraumatic  Eyes:      General: No scleral icterus  Right eye: No discharge  Left eye: No discharge  Extraocular Movements: Extraocular movements intact  Conjunctiva/sclera: Conjunctivae normal    Neck:      Thyroid: No thyromegaly  Vascular: No JVD  Trachea: No tracheal deviation  Cardiovascular:      Rate and Rhythm: Normal rate and regular rhythm  Heart sounds: Normal heart sounds  No murmur heard  No gallop  Pulmonary:      Effort: Pulmonary effort is normal  No respiratory distress  Breath sounds: Normal breath sounds  No wheezing or rales  Chest:      Chest wall: No tenderness  Abdominal:      General: There is no distension  Palpations: Abdomen is soft  There is no mass  Tenderness: There is no abdominal tenderness  There is no guarding or rebound  Hernia: No hernia is present  Musculoskeletal:         General: No tenderness or deformity  Normal range of motion  Cervical back: Normal range of motion and neck supple  Lymphadenopathy:      Cervical: No cervical adenopathy  Neurological:      Mental Status: She is alert and oriented to person, place, and time  Skin:     General: Skin is warm and dry  Psychiatric:         Mood and Affect: Mood normal          Behavior: Behavior normal          Thought Content: Thought content normal          Judgment: Judgment normal    Vitals and nursing note reviewed

## 2022-08-15 ENCOUNTER — ANNUAL EXAM (OUTPATIENT)
Dept: OBGYN CLINIC | Facility: CLINIC | Age: 52
End: 2022-08-15
Payer: COMMERCIAL

## 2022-08-15 VITALS
DIASTOLIC BLOOD PRESSURE: 80 MMHG | HEIGHT: 67 IN | BODY MASS INDEX: 26.21 KG/M2 | SYSTOLIC BLOOD PRESSURE: 120 MMHG | WEIGHT: 167 LBS

## 2022-08-15 DIAGNOSIS — Z12.31 ENCOUNTER FOR SCREENING MAMMOGRAM FOR BREAST CANCER: ICD-10-CM

## 2022-08-15 DIAGNOSIS — Z01.419 ENCOUNTER FOR ANNUAL ROUTINE GYNECOLOGICAL EXAMINATION: Primary | ICD-10-CM

## 2022-08-15 PROCEDURE — 0503F POSTPARTUM CARE VISIT: CPT | Performed by: OBSTETRICS & GYNECOLOGY

## 2022-08-15 PROCEDURE — 99396 PREV VISIT EST AGE 40-64: CPT | Performed by: OBSTETRICS & GYNECOLOGY

## 2022-08-15 RX ORDER — OMEGA-3/DHA/EPA/FISH OIL 300-1000MG
2 CAPSULE ORAL DAILY
COMMUNITY

## 2022-08-15 RX ORDER — PHENOL 1.4 %
1000 AEROSOL, SPRAY (ML) MUCOUS MEMBRANE 2 TIMES DAILY WITH MEALS
COMMUNITY

## 2022-08-17 ENCOUNTER — HOSPITAL ENCOUNTER (OUTPATIENT)
Dept: RADIOLOGY | Age: 52
Discharge: HOME | End: 2022-08-17
Payer: COMMERCIAL

## 2022-08-17 VITALS — WEIGHT: 167 LBS | HEIGHT: 67 IN | BODY MASS INDEX: 26.21 KG/M2

## 2022-08-17 DIAGNOSIS — Z12.31 ENCOUNTER FOR SCREENING MAMMOGRAM FOR BREAST CANCER: ICD-10-CM

## 2022-08-17 PROCEDURE — 77067 SCR MAMMO BI INCL CAD: CPT

## 2022-08-17 PROCEDURE — 77063 BREAST TOMOSYNTHESIS BI: CPT

## 2023-08-15 NOTE — PROGRESS NOTES
Assessment/Plan:  Normal gynecologic exam  HF's ' - Black Cohosh 20 mg bid- never used. PMB , Cervical Polyp- Polypectomy, HSC, EMB . CoTest   Return to the office in 1 year  Self breast exams monthly  Annual 3-D mammography - active order  Colonoscopy  -  Colon Ca - nl , due '  Calcium 1,000 mg/d- does  Exercise 3 times a week- very infrequent due to work     Depression screen: Neg       Diagnoses and all orders for this visit:    Encounter for annual routine gynecological examination    Encounter for screening mammogram for breast cancer  -     Mammo screening bilateral w 3d & cad; Future    Other orders  -     Coenzyme Q10 (Co Q 10) 10 MG CAPS              Subjective:        Patient ID: Yajaira Wilcox is a 48 y.o. female. [facility] Kelsea presents today for a gynecological evaluation. She has no gynecological complaints. At her son's graduation she developed a stroke. She was found to have a patent foramen ovale. This was closed on August 3. On  she was notified by her Apple Watch that her heart rate was 120 bpm and she was resting. She went to the emergency room and was found to be in a flutter. Yesterday she underwent a NOE cardioversion. Prior to her illnesses she was sexually active once a week. She denies any vaginal bleeding. She is now on Eliquis and Plavix. The following portions of the patient's history were reviewed and updated as appropriate: She  has a past medical history of Stroke (cerebrum) ([address]).   Patient Active Problem List    Diagnosis Date Noted   • Encounter for annual routine gynecological examination 2018   • Encounter for screening mammogram for breast cancer 2018   PMH:   2 para 2; OFD, then SAVD - 41 wks '99 and induced at 39 wks- 5 cm. '01  DDD- Back  Menorrhagia, PC Bleeding Cervical Polyp - HSC, D&C, Polypectomy, Cauterization of Cx   Basal Cell Ca - Face and Shoulders '12, 2nd episode MOH's nose   B12 Deficiency     BELEM- '14       Menopause 1/18      Abnormal mammogram of the right breast 6/20- normal diagnostic mammogram, return to routine screening.      PMB 11/19- Endosee with nl EMB [atrophy] Cervical Polypectomy 8/20      Chest Pain Fall '20- nl Stress Test, had swelling- NEG Factor V Leiden      Forehead Basal Cell Ca- Mohs 3/21      CVA 5/23 - Patent Foramen Ovale -Plavix and aspirin. Closed 8/3      A. Flutter [rare combination] 8/9/23 - NOE Cardioversion 8/15 -Eliquis and metoprolol added    She  has a past surgical history that includes Nose surgery and Dilation and curettage of uterus. Her family history includes Aortic aneurysm in her father; Colon cancer in her maternal grandmother; Endometrial cancer (age of onset: 71) in her paternal aunt; Heart disease in her paternal grandfather; Lung cancer in her maternal grandfather; No Known Problems in her brother, brother, brother, daughter, paternal aunt, paternal grandmother, and son; Pancreatic cancer (age of onset: 68) in her mother. FH:   Maternal grandmother- colon cancer  MGF- Lung Ca  PGF- MI 39  Mother- colon polyps, Pancreatitis, d POD 1 after surgery for Pancreatic Cancer 7/17, DVT 4/17  Father- AAA, 2 repaired 68, Squamous Cell Ca -  face, d 11/16 ischemic bowel, acute obstruction  MGM- Colon Ca  PA- Uterine Ca '14, d metastatic Ca  Early 70's 12/19, had Chemo  MU- PE, + Factor V  She  reports that she has never smoked. She has never used smokeless tobacco. She reports current alcohol use. She reports that she does not use drugs. SH:  Returned to work in November '15 her former place of employment prior to children - . The office moved to [place] which is a longer commute.   '98- Jony works there too. Vasectomy. Ginger 6/99 - graduated [employer] '21. Moved to [place] to live with her boyfriend '22 and is now engaged. The wedding will be in [place] 8/24.   Ivis Massey 4/01- graduated [employer] at [address]- both me.  He is able to play soccer while doing his master's. She had her stroke on the day he graduated. Current Outpatient Medications   Medication Sig Dispense Refill   • calcium carbonate (OS-IRASEMA) 600 MG tablet Take 1,000 mg by mouth 2 (two) times a day with meals     • Cholecalciferol 50 MCG (2000 UT) CAPS Take 1 capsule by mouth daily     • Coenzyme Q10 (Co Q 10) 10 MG CAPS      • cyanocobalamin (VITAMIN B-12) 1000 MCG tablet Take 1,000 mcg by mouth daily     • Ferrous Gluconate 256 (28 Fe) MG TABS Take 65 mg by mouth     • fish oil-omega-3 fatty acids 1000 MG capsule Take 2 g by mouth daily     • Multiple Vitamins-Minerals (VITAMINS TO GO WOMEN PO) Take by mouth     • Plant Sterols and Stanols (CHOLEST OFF PO) Take by mouth       No current facility-administered medications for this visit. Current Outpatient Medications on File Prior to Visit   Medication Sig   • calcium carbonate (OS-IRASEMA) 600 MG tablet Take 1,000 mg by mouth 2 (two) times a day with meals   • Cholecalciferol 50 MCG (2000 UT) CAPS Take 1 capsule by mouth daily   • Coenzyme Q10 (Co Q 10) 10 MG CAPS    • cyanocobalamin (VITAMIN B-12) 1000 MCG tablet Take 1,000 mcg by mouth daily   • Ferrous Gluconate 256 (28 Fe) MG TABS Take 65 mg by mouth   • fish oil-omega-3 fatty acids 1000 MG capsule Take 2 g by mouth daily   • Multiple Vitamins-Minerals (VITAMINS TO GO WOMEN PO) Take by mouth   • Plant Sterols and Stanols (CHOLEST OFF PO) Take by mouth     No current facility-administered medications on file prior to visit. She has No Known Allergies. .    Review of Systems   Constitutional: Negative for activity change, appetite change, fatigue and unexpected weight change. Eyes: Positive for visual disturbance. Respiratory: Negative for cough, chest tightness, shortness of breath and wheezing. Cardiovascular: Negative for chest pain, palpitations and leg swelling.         Breast: Patient denies tenderness, nipple discharge, masses, or erythema. Gastrointestinal: Negative for abdominal distention, abdominal pain, blood in stool, constipation, diarrhea, nausea and vomiting. Endocrine: Negative for cold intolerance and heat intolerance. Genitourinary: Negative for decreased urine volume, difficulty urinating, dyspareunia, dysuria, frequency, hematuria, menstrual problem, pelvic pain, urgency, vaginal bleeding, vaginal discharge and vaginal pain. No incontinence. Sexually active once a week sometimes using a lubricant. Musculoskeletal: Negative for arthralgias. Skin: Negative for rash. Neurological: Positive for speech difficulty (On the same day in May) and numbness (Right arm in May). Negative for weakness, light-headedness and headaches. Hematological: Does not bruise/bleed easily. Psychiatric/Behavioral: Negative for agitation, behavioral problems and sleep disturbance. The patient is nervous/anxious. Objective:    Vitals:    08/16/23 1024   BP: 120/70   BP Location: Right arm   Patient Position: Sitting   Cuff Size: Standard   Weight: 77.6 kg (171 lb)   Height: 5' 7" (1.702 m)            Physical Exam  Vitals and nursing note reviewed. Constitutional:       General: She is not in acute distress. Appearance: She is well-developed. HENT:      Head: Normocephalic and atraumatic. Eyes:      General: No scleral icterus. Right eye: No discharge. Left eye: No discharge. Extraocular Movements: Extraocular movements intact. Conjunctiva/sclera: Conjunctivae normal.   Neck:      Thyroid: No thyromegaly. Trachea: No tracheal deviation. Cardiovascular:      Rate and Rhythm: Normal rate and regular rhythm. Heart sounds: Normal heart sounds. No murmur heard. Pulmonary:      Effort: Pulmonary effort is normal. No respiratory distress. Breath sounds: Normal breath sounds. No wheezing.    Chest:   Breasts:     Breasts are symmetrical.      Right: No inverted nipple, mass, nipple discharge, skin change or tenderness. Left: No inverted nipple, mass, nipple discharge, skin change or tenderness. Abdominal:      General: Bowel sounds are normal. There is no distension. Palpations: Abdomen is soft. There is no mass. Tenderness: There is no abdominal tenderness. There is no guarding or rebound. Genitourinary:     General: Normal vulva. Labia:         Right: No rash, tenderness or lesion. Left: No rash, tenderness or lesion. Vagina: Normal.      Cervix: No cervical motion tenderness or discharge. Uterus: Not deviated, not enlarged and not tender. Adnexa:         Right: No mass, tenderness or fullness. Left: No mass, tenderness or fullness. Rectum: No external hemorrhoid. Comments: Urethral meatus within normal limits. Perineum within normal limits. Bladder well supported. Musculoskeletal:         General: No tenderness. Normal range of motion. Cervical back: Normal range of motion and neck supple. Lymphadenopathy:      Cervical: No cervical adenopathy. Skin:     General: Skin is warm and dry. Neurological:      Mental Status: She is alert and oriented to person, place, and time. Psychiatric:         Mood and Affect: Mood normal.         Behavior: Behavior normal.         Thought Content:  Thought content normal.         Judgment: Judgment normal.

## 2023-08-16 ENCOUNTER — ANNUAL EXAM (OUTPATIENT)
Dept: OBGYN CLINIC | Facility: CLINIC | Age: 53
End: 2023-08-16
Payer: COMMERCIAL

## 2023-08-16 VITALS
SYSTOLIC BLOOD PRESSURE: 120 MMHG | HEIGHT: 67 IN | WEIGHT: 171 LBS | BODY MASS INDEX: 26.84 KG/M2 | DIASTOLIC BLOOD PRESSURE: 70 MMHG

## 2023-08-16 DIAGNOSIS — Z01.419 ENCOUNTER FOR ANNUAL ROUTINE GYNECOLOGICAL EXAMINATION: Primary | ICD-10-CM

## 2023-08-16 DIAGNOSIS — Z12.31 ENCOUNTER FOR SCREENING MAMMOGRAM FOR BREAST CANCER: ICD-10-CM

## 2023-08-16 PROCEDURE — S0612 ANNUAL GYNECOLOGICAL EXAMINA: HCPCS | Performed by: OBSTETRICS & GYNECOLOGY

## 2023-08-16 RX ORDER — ASCORBIC ACID 1000 MG
TABLET ORAL
COMMUNITY
Start: 2023-07-14

## 2023-09-21 ENCOUNTER — HOSPITAL ENCOUNTER (OUTPATIENT)
Dept: RADIOLOGY | Age: 53
Discharge: HOME | End: 2023-09-21
Payer: COMMERCIAL

## 2023-09-21 DIAGNOSIS — Z12.31 ENCOUNTER FOR SCREENING MAMMOGRAM FOR BREAST CANCER: ICD-10-CM

## 2023-09-21 DIAGNOSIS — Z12.31 ENCOUNTER FOR SCREENING MAMMOGRAM FOR MALIGNANT NEOPLASM OF BREAST: ICD-10-CM

## 2023-09-21 PROCEDURE — 77063 BREAST TOMOSYNTHESIS BI: CPT

## 2023-09-21 PROCEDURE — 77067 SCR MAMMO BI INCL CAD: CPT

## 2024-02-21 PROBLEM — Z01.419 ENCOUNTER FOR ANNUAL ROUTINE GYNECOLOGICAL EXAMINATION: Status: RESOLVED | Noted: 2018-06-11 | Resolved: 2024-02-21

## 2024-08-14 ENCOUNTER — ANNUAL EXAM (OUTPATIENT)
Dept: OBGYN CLINIC | Facility: CLINIC | Age: 54
End: 2024-08-14
Payer: COMMERCIAL

## 2024-08-14 VITALS
WEIGHT: 164.8 LBS | HEIGHT: 67 IN | DIASTOLIC BLOOD PRESSURE: 70 MMHG | BODY MASS INDEX: 25.87 KG/M2 | SYSTOLIC BLOOD PRESSURE: 104 MMHG

## 2024-08-14 DIAGNOSIS — Z12.31 ENCOUNTER FOR SCREENING MAMMOGRAM FOR BREAST CANCER: Primary | ICD-10-CM

## 2024-08-14 DIAGNOSIS — Z01.419 ENCOUNTER FOR ANNUAL ROUTINE GYNECOLOGICAL EXAMINATION: Primary | ICD-10-CM

## 2024-08-14 DIAGNOSIS — Z12.31 ENCOUNTER FOR SCREENING MAMMOGRAM FOR BREAST CANCER: ICD-10-CM

## 2024-08-14 PROCEDURE — S0612 ANNUAL GYNECOLOGICAL EXAMINA: HCPCS | Performed by: OBSTETRICS & GYNECOLOGY

## 2024-08-14 RX ORDER — ATORVASTATIN CALCIUM 20 MG/1
20 TABLET, FILM COATED ORAL DAILY
COMMUNITY
Start: 2024-03-12

## 2024-08-14 RX ORDER — DIAPER,BRIEF,ADULT, DISPOSABLE
200 EACH MISCELLANEOUS DAILY
COMMUNITY
Start: 2024-06-19

## 2024-08-14 RX ORDER — ASPIRIN 81 MG/1
TABLET ORAL
COMMUNITY
Start: 2023-05-06

## 2024-08-14 RX ORDER — MULTIVIT-MIN/IRON/FOLIC ACID/K 18-600-40
CAPSULE ORAL
COMMUNITY

## 2024-08-14 NOTE — PROGRESS NOTES
Assessment/Plan:  Normal gynecologic exam  HF's ' - Black Cohosh 20 mg bid- never used.  PMB , Cervical Polyp- Polypectomy, HSC, EMB .  CoTest   Return to the office in 1 year  Self breast exams monthly  Annual 3-D mammography - active order  Colonoscopy  -  Colon Ca - nl , due '  Calcium 1,000 mg/d- does  Exercise 3 times a week- very infrequent due to work      Depression screen: Neg       Diagnoses and all orders for this visit:    Encounter for annual routine gynecological examination    Encounter for screening mammogram for breast cancer    Other orders  -     aspirin (Aspirin Adult Low Dose) 81 mg EC tablet  -     atorvastatin (LIPITOR) 20 mg tablet; Take 20 mg by mouth daily  -     [employer] CoQ-10 200 MG capsule; Take 200 mg by mouth daily  -     Ascorbic Acid (Vitamin C) 500 MG CAPS; Take by mouth              Subjective:        Patient ID: Dana Hernandez is a 54 y.o. female.    Dana presents today for an annual visit.  She has no gynecological complaints.  She denies any vaginal bleeding.  She remains sexually active.  She has had no neurologic recurrences.  She was on Eliquis for 2 to 3 months and Plavix for 9 months.        The following portions of the patient's history were reviewed and updated as appropriate: She  has a past medical history of Stroke (cerebrum) (HCC) and Stroke (HCC) (2023).  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 '01  DDD- Back  Menorrhagia, PC Bleeding Cervical Polyp - HSC, D&C, Polypectomy, Cauterization of Cx   Basal Cell Ca - Face and Shoulders ', 2nd episode MOH's nose   B12 Deficiency       BELEM-        Menopause       Abnormal mammogram of the right breast - normal diagnostic mammogram, return to routine screening.      PMB - Endosee with nl EMB [atrophy] Cervical  Polypectomy       Chest Pain Fall - nl Stress Test, had swelling- NEG Factor V Leiden      Forehead Basal Cell Ca- Mohs 3/21      CVA  - Patent Foramen Ovale -Plavix and aspirin. Closed 8/3      A. Flutter [rare combination] 23 - NOE Cardioversion 8/15 -Eliquis and metoprolol added  PMH:   2 para 2; OFD, then SAVD - 41 wks '99 and induced at 39 wks- 5 cm.   DDD- Back  Menorrhagia, PC Bleeding Cervical Polyp - HSC, D&C, Polypectomy, Cauterization of Cx   Basal Cell Ca - Face and Shoulders , 2nd episode MOH's nose   B12 Deficiency       BELEM-        Menopause       Abnormal mammogram of the right breast - normal diagnostic mammogram, return to routine screening.      PMB - Endosee with nl EMB [atrophy] Cervical Polypectomy       Chest Pain - nl Stress Test, had swelling- NEG Factor V Leiden      Forehead Basal Cell Ca- Mohs 3/21      CVA  - Patent Foramen Ovale -Plavix and aspirin. Closed 8/3      A. Flutter [rare combination] 23 - NOE Cardioversion 8/15 -Eliquis and metoprolol added  She  has a past surgical history that includes Nose surgery and Dilation and curettage of uterus.  Her family history includes Aortic aneurysm in her father; Colon cancer in her maternal grandmother; Endometrial cancer (age of onset: 69) in her paternal aunt; Heart disease in her paternal grandfather; Lung cancer in her maternal grandfather; No Known Problems in her brother, brother, brother, daughter, paternal aunt, paternal grandmother, and son; Pancreatic cancer (age of onset: 73) in her mother.  FH:   Maternal grandmother- colon cancer  MGF- Lung Ca  PGF- MI 45  Mother- colon polyps, Pancreatitis, d POD 1 after surgery for Pancreatic Cancer , DVT   Father- AAA, 2 repaired 73, Squamous Cell Ca -  face, d  ischemic bowel, acute obstruction  MGM- Colon Ca  PA- Uterine Ca ', d metastatic Ca  Early 70's , had Chemo  MU- PE, + Factor V  She  reports  that she has never smoked. She has never used smokeless tobacco. She reports current alcohol use. She reports that she does not use drugs.  SH:  Returned to work 11/15 to her former place of employment prior to children - . The office moved to [place] which is a longer commute.   '98- Jony works there too. Vasectomy.  Ginger 6/99 - graduated [employer] '21. Moved to [place] to live with her boyfriend '22 and is now engaged.  The wedding will be in [place] 8/22/24.  I asked her to send me pictures through [employer]. Leno 4/01- graduated [employer], Athlete of the Year at [employer] - both me.  He is able to play soccer while doing his master's.  She had her stroke on the day he graduated.  Current Outpatient Medications   Medication Sig Dispense Refill    Ascorbic Acid (Vitamin C) 500 MG CAPS Take by mouth      aspirin (Aspirin Adult Low Dose) 81 mg EC tablet       atorvastatin (LIPITOR) 20 mg tablet Take 20 mg by mouth daily      calcium carbonate (OS-IRASEMA) 600 MG tablet Take 1,000 mg by mouth 2 (two) times a day with meals      Cholecalciferol 50 MCG (2000 UT) CAPS Take 1 capsule by mouth daily      CVS CoQ-10 200 MG capsule Take 200 mg by mouth daily      cyanocobalamin (VITAMIN B-12) 1000 MCG tablet Take 1,000 mcg by mouth daily      Ferrous Gluconate 256 (28 Fe) MG TABS Take 65 mg by mouth      fish oil-omega-3 fatty acids 1000 MG capsule Take 2 g by mouth daily      Multiple Vitamins-Minerals (VITAMINS TO GO WOMEN PO) Take by mouth      Coenzyme Q10 (Co Q 10) 10 MG CAPS  (Patient not taking: Reported on 8/14/2024)      Plant Sterols and Stanols (CHOLEST OFF PO) Take by mouth (Patient not taking: Reported on 8/14/2024)       No current facility-administered medications for this visit.     Current Outpatient Medications on File Prior to Visit   Medication Sig    Ascorbic Acid (Vitamin C) 500 MG CAPS Take by mouth    aspirin (Aspirin Adult Low Dose) 81 mg EC tablet      atorvastatin (LIPITOR) 20 mg tablet Take 20 mg by mouth daily    calcium carbonate (OS-IRASEMA) 600 MG tablet Take 1,000 mg by mouth 2 (two) times a day with meals    Cholecalciferol 50 MCG (2000 UT) CAPS Take 1 capsule by mouth daily    CVS CoQ-10 200 MG capsule Take 200 mg by mouth daily    cyanocobalamin (VITAMIN B-12) 1000 MCG tablet Take 1,000 mcg by mouth daily    Ferrous Gluconate 256 (28 Fe) MG TABS Take 65 mg by mouth    fish oil-omega-3 fatty acids 1000 MG capsule Take 2 g by mouth daily    Multiple Vitamins-Minerals (VITAMINS TO GO WOMEN PO) Take by mouth    Coenzyme Q10 (Co Q 10) 10 MG CAPS  (Patient not taking: Reported on 8/14/2024)    Plant Sterols and Stanols (CHOLEST OFF PO) Take by mouth (Patient not taking: Reported on 8/14/2024)     No current facility-administered medications on file prior to visit.     She has No Known Allergies..    Review of Systems   Constitutional:  Negative for activity change, appetite change, fatigue and unexpected weight change.   Eyes:  Positive for visual disturbance.   Respiratory:  Negative for cough, chest tightness, shortness of breath and wheezing.    Cardiovascular:  Negative for chest pain, palpitations and leg swelling.        Breast: Patient denies tenderness, nipple discharge, masses, or erythema.   Gastrointestinal:  Negative for abdominal distention, abdominal pain, blood in stool, constipation, diarrhea, nausea and vomiting.   Endocrine: Negative for cold intolerance and heat intolerance.   Genitourinary:  Negative for decreased urine volume, difficulty urinating, dyspareunia, dysuria, frequency, hematuria, menstrual problem, pelvic pain, urgency, vaginal bleeding, vaginal discharge and vaginal pain.        No incontinence.  Sexually active once a week sometimes using a lubricant.   Musculoskeletal:  Negative for arthralgias.   Skin:  Negative for rash.   Neurological:  Negative for speech difficulty, weakness, light-headedness, numbness and headaches.  "  Hematological:  Does not bruise/bleed easily.   Psychiatric/Behavioral:  Negative for agitation, behavioral problems and sleep disturbance. The patient is nervous/anxious.          Objective:    Vitals:    08/14/24 1105   BP: 104/70   BP Location: Left arm   Patient Position: Sitting   Cuff Size: Standard   Weight: 74.8 kg (164 lb 12.8 oz)   Height: 5' 6.7\" (1.694 m)            Physical Exam  Vitals and nursing note reviewed.   Constitutional:       General: She is not in acute distress.     Appearance: She is well-developed.   HENT:      Head: Normocephalic and atraumatic.   Eyes:      General: No scleral icterus.        Right eye: No discharge.         Left eye: No discharge.      Extraocular Movements: Extraocular movements intact.      Conjunctiva/sclera: Conjunctivae normal.   Neck:      Thyroid: No thyromegaly.      Trachea: No tracheal deviation.   Cardiovascular:      Rate and Rhythm: Normal rate and regular rhythm.      Heart sounds: Normal heart sounds. No murmur heard.  Pulmonary:      Effort: Pulmonary effort is normal. No respiratory distress.      Breath sounds: Normal breath sounds. No wheezing.   Chest:   Breasts:     Breasts are symmetrical.      Right: No inverted nipple, mass, nipple discharge, skin change or tenderness.      Left: No inverted nipple, mass, nipple discharge, skin change or tenderness.   Abdominal:      General: Bowel sounds are normal. There is no distension.      Palpations: Abdomen is soft. There is no mass.      Tenderness: There is no abdominal tenderness. There is no guarding or rebound.   Genitourinary:     General: Normal vulva.      Labia:         Right: No rash, tenderness or lesion.         Left: No rash, tenderness or lesion.       Vagina: Normal.      Cervix: No cervical motion tenderness or discharge.      Uterus: Not deviated, not enlarged and not tender.       Adnexa:         Right: No mass, tenderness or fullness.          Left: No mass, tenderness or fullness.   "      Rectum: No external hemorrhoid.      Comments: Urethral meatus within normal limits.  Perineum within normal limits.  Bladder well supported.  Physiologic vaginal atrophy.  Musculoskeletal:         General: No tenderness. Normal range of motion.      Cervical back: Normal range of motion and neck supple.   Lymphadenopathy:      Cervical: No cervical adenopathy.   Skin:     General: Skin is warm and dry.   Neurological:      Mental Status: She is alert and oriented to person, place, and time.   Psychiatric:         Mood and Affect: Mood normal.         Behavior: Behavior normal.         Thought Content: Thought content normal.         Judgment: Judgment normal.

## 2024-09-23 ENCOUNTER — HOSPITAL ENCOUNTER (OUTPATIENT)
Dept: RADIOLOGY | Age: 54
Discharge: HOME | End: 2024-09-23
Payer: COMMERCIAL

## 2024-09-23 VITALS — WEIGHT: 164 LBS | HEIGHT: 66 IN | BODY MASS INDEX: 26.36 KG/M2

## 2024-09-23 DIAGNOSIS — Z12.31 ENCOUNTER FOR SCREENING MAMMOGRAM FOR BREAST CANCER: ICD-10-CM

## 2024-09-23 PROCEDURE — 77067 SCR MAMMO BI INCL CAD: CPT

## 2024-09-23 PROCEDURE — 77063 BREAST TOMOSYNTHESIS BI: CPT
